# Patient Record
Sex: MALE | Race: WHITE | NOT HISPANIC OR LATINO | Employment: UNEMPLOYED | URBAN - METROPOLITAN AREA
[De-identification: names, ages, dates, MRNs, and addresses within clinical notes are randomized per-mention and may not be internally consistent; named-entity substitution may affect disease eponyms.]

---

## 2019-03-22 ENCOUNTER — HOSPITAL ENCOUNTER (INPATIENT)
Facility: CLINIC | Age: 39
LOS: 3 days | Discharge: HOME | End: 2019-03-25
Attending: EMERGENCY MEDICINE | Admitting: PSYCHIATRY & NEUROLOGY
Payer: MEDICAID

## 2019-03-22 ENCOUNTER — TELEPHONE (OUTPATIENT)
Dept: BEHAVIORAL HEALTH | Facility: CLINIC | Age: 39
End: 2019-03-22

## 2019-03-22 DIAGNOSIS — F10.220 ALCOHOL DEPENDENCE WITH UNCOMPLICATED INTOXICATION (H): ICD-10-CM

## 2019-03-22 DIAGNOSIS — R16.0 HEPATOMEGALY: ICD-10-CM

## 2019-03-22 DIAGNOSIS — K76.0 STEATOSIS OF LIVER: ICD-10-CM

## 2019-03-22 DIAGNOSIS — R74.01 TRANSAMINITIS: Primary | ICD-10-CM

## 2019-03-22 PROBLEM — F10.10 ETOH ABUSE: Status: ACTIVE | Noted: 2019-03-22

## 2019-03-22 LAB
ALBUMIN SERPL-MCNC: 4.5 G/DL (ref 3.4–5)
ALCOHOL BREATH TEST: 0.3 (ref 0–0.01)
ALP SERPL-CCNC: 115 U/L (ref 40–150)
ALT SERPL W P-5'-P-CCNC: 101 U/L (ref 0–70)
AMPHETAMINES UR QL SCN: NEGATIVE
ANION GAP SERPL CALCULATED.3IONS-SCNC: 15 MMOL/L (ref 3–14)
AST SERPL W P-5'-P-CCNC: 108 U/L (ref 0–45)
BARBITURATES UR QL: NEGATIVE
BASOPHILS # BLD AUTO: 0.1 10E9/L (ref 0–0.2)
BASOPHILS NFR BLD AUTO: 1.1 %
BENZODIAZ UR QL: NEGATIVE
BILIRUB SERPL-MCNC: 1.2 MG/DL (ref 0.2–1.3)
BUN SERPL-MCNC: 13 MG/DL (ref 7–30)
CALCIUM SERPL-MCNC: 8.6 MG/DL (ref 8.5–10.1)
CANNABINOIDS UR QL SCN: POSITIVE
CHLORIDE SERPL-SCNC: 102 MMOL/L (ref 94–109)
CO2 SERPL-SCNC: 24 MMOL/L (ref 20–32)
COCAINE UR QL: NEGATIVE
CREAT SERPL-MCNC: 0.77 MG/DL (ref 0.66–1.25)
DIFFERENTIAL METHOD BLD: NORMAL
EOSINOPHIL # BLD AUTO: 0.1 10E9/L (ref 0–0.7)
EOSINOPHIL NFR BLD AUTO: 1.6 %
ERYTHROCYTE [DISTWIDTH] IN BLOOD BY AUTOMATED COUNT: 13.4 % (ref 10–15)
ETHANOL UR QL SCN: POSITIVE
GFR SERPL CREATININE-BSD FRML MDRD: >90 ML/MIN/{1.73_M2}
GLUCOSE SERPL-MCNC: 83 MG/DL (ref 70–99)
HCT VFR BLD AUTO: 44.1 % (ref 40–53)
HGB BLD-MCNC: 15.1 G/DL (ref 13.3–17.7)
IMM GRANULOCYTES # BLD: 0 10E9/L (ref 0–0.4)
IMM GRANULOCYTES NFR BLD: 0.2 %
LYMPHOCYTES # BLD AUTO: 2.1 10E9/L (ref 0.8–5.3)
LYMPHOCYTES NFR BLD AUTO: 36.9 %
MCH RBC QN AUTO: 32.6 PG (ref 26.5–33)
MCHC RBC AUTO-ENTMCNC: 34.2 G/DL (ref 31.5–36.5)
MCV RBC AUTO: 95 FL (ref 78–100)
MONOCYTES # BLD AUTO: 0.3 10E9/L (ref 0–1.3)
MONOCYTES NFR BLD AUTO: 5.3 %
NEUTROPHILS # BLD AUTO: 3.1 10E9/L (ref 1.6–8.3)
NEUTROPHILS NFR BLD AUTO: 54.9 %
NRBC # BLD AUTO: 0 10*3/UL
NRBC BLD AUTO-RTO: 0 /100
OPIATES UR QL SCN: NEGATIVE
PLATELET # BLD AUTO: 236 10E9/L (ref 150–450)
POTASSIUM SERPL-SCNC: 4.1 MMOL/L (ref 3.4–5.3)
PROT SERPL-MCNC: 8.2 G/DL (ref 6.8–8.8)
RBC # BLD AUTO: 4.63 10E12/L (ref 4.4–5.9)
SODIUM SERPL-SCNC: 141 MMOL/L (ref 133–144)
WBC # BLD AUTO: 5.6 10E9/L (ref 4–11)

## 2019-03-22 PROCEDURE — 80320 DRUG SCREEN QUANTALCOHOLS: CPT | Performed by: EMERGENCY MEDICINE

## 2019-03-22 PROCEDURE — 85025 COMPLETE CBC W/AUTO DIFF WBC: CPT | Performed by: EMERGENCY MEDICINE

## 2019-03-22 PROCEDURE — 12800008 ZZH R&B CD ADULT

## 2019-03-22 PROCEDURE — 80307 DRUG TEST PRSMV CHEM ANLYZR: CPT | Performed by: EMERGENCY MEDICINE

## 2019-03-22 PROCEDURE — 80053 COMPREHEN METABOLIC PANEL: CPT | Performed by: EMERGENCY MEDICINE

## 2019-03-22 PROCEDURE — 25000132 ZZH RX MED GY IP 250 OP 250 PS 637: Performed by: PSYCHIATRY & NEUROLOGY

## 2019-03-22 PROCEDURE — 99285 EMERGENCY DEPT VISIT HI MDM: CPT | Mod: Z6 | Performed by: EMERGENCY MEDICINE

## 2019-03-22 PROCEDURE — 99285 EMERGENCY DEPT VISIT HI MDM: CPT | Mod: 25

## 2019-03-22 PROCEDURE — HZ2ZZZZ DETOXIFICATION SERVICES FOR SUBSTANCE ABUSE TREATMENT: ICD-10-PCS | Performed by: PSYCHIATRY & NEUROLOGY

## 2019-03-22 PROCEDURE — 82075 ASSAY OF BREATH ETHANOL: CPT

## 2019-03-22 RX ORDER — BISACODYL 10 MG
10 SUPPOSITORY, RECTAL RECTAL DAILY PRN
Status: DISCONTINUED | OUTPATIENT
Start: 2019-03-22 | End: 2019-03-25 | Stop reason: HOSPADM

## 2019-03-22 RX ORDER — LANOLIN ALCOHOL/MO/W.PET/CERES
100 CREAM (GRAM) TOPICAL DAILY
Status: DISCONTINUED | OUTPATIENT
Start: 2019-03-22 | End: 2019-03-25 | Stop reason: HOSPADM

## 2019-03-22 RX ORDER — MULTIPLE VITAMINS W/ MINERALS TAB 9MG-400MCG
1 TAB ORAL DAILY
Status: DISCONTINUED | OUTPATIENT
Start: 2019-03-22 | End: 2019-03-25 | Stop reason: HOSPADM

## 2019-03-22 RX ORDER — ALUMINA, MAGNESIA, AND SIMETHICONE 2400; 2400; 240 MG/30ML; MG/30ML; MG/30ML
30 SUSPENSION ORAL EVERY 4 HOURS PRN
Status: DISCONTINUED | OUTPATIENT
Start: 2019-03-22 | End: 2019-03-25 | Stop reason: HOSPADM

## 2019-03-22 RX ORDER — IBUPROFEN 200 MG
400 TABLET ORAL EVERY 6 HOURS PRN
Status: DISCONTINUED | OUTPATIENT
Start: 2019-03-22 | End: 2019-03-25 | Stop reason: HOSPADM

## 2019-03-22 RX ORDER — ATENOLOL 50 MG/1
50 TABLET ORAL DAILY PRN
Status: DISCONTINUED | OUTPATIENT
Start: 2019-03-22 | End: 2019-03-25 | Stop reason: HOSPADM

## 2019-03-22 RX ORDER — HYDROXYZINE HYDROCHLORIDE 25 MG/1
25 TABLET, FILM COATED ORAL EVERY 4 HOURS PRN
Status: DISCONTINUED | OUTPATIENT
Start: 2019-03-22 | End: 2019-03-25 | Stop reason: HOSPADM

## 2019-03-22 RX ORDER — DIAZEPAM 5 MG
5-20 TABLET ORAL EVERY 30 MIN PRN
Status: DISCONTINUED | OUTPATIENT
Start: 2019-03-22 | End: 2019-03-25 | Stop reason: HOSPADM

## 2019-03-22 RX ORDER — ONDANSETRON 4 MG/1
4 TABLET, ORALLY DISINTEGRATING ORAL EVERY 6 HOURS PRN
Status: DISCONTINUED | OUTPATIENT
Start: 2019-03-22 | End: 2019-03-25 | Stop reason: HOSPADM

## 2019-03-22 RX ORDER — CARBOXYMETHYLCELLULOSE SODIUM 5 MG/ML
1 SOLUTION/ DROPS OPHTHALMIC 3 TIMES DAILY PRN
Status: DISCONTINUED | OUTPATIENT
Start: 2019-03-22 | End: 2019-03-25 | Stop reason: HOSPADM

## 2019-03-22 RX ORDER — FOLIC ACID 1 MG/1
1 TABLET ORAL DAILY
Status: DISCONTINUED | OUTPATIENT
Start: 2019-03-22 | End: 2019-03-25 | Stop reason: HOSPADM

## 2019-03-22 RX ORDER — TRAZODONE HYDROCHLORIDE 50 MG/1
50 TABLET, FILM COATED ORAL
Status: DISCONTINUED | OUTPATIENT
Start: 2019-03-22 | End: 2019-03-25 | Stop reason: HOSPADM

## 2019-03-22 RX ADMIN — DIAZEPAM 5 MG: 5 TABLET ORAL at 20:20

## 2019-03-22 RX ADMIN — NICOTINE POLACRILEX 4 MG: 2 GUM, CHEWING BUCCAL at 20:20

## 2019-03-22 RX ADMIN — MULTIPLE VITAMINS W/ MINERALS TAB 1 TABLET: TAB at 18:55

## 2019-03-22 RX ADMIN — FOLIC ACID 1 MG: 1 TABLET ORAL at 18:55

## 2019-03-22 RX ADMIN — DIAZEPAM 10 MG: 5 TABLET ORAL at 23:59

## 2019-03-22 RX ADMIN — Medication 100 MG: at 18:55

## 2019-03-22 RX ADMIN — IBUPROFEN 400 MG: 200 TABLET, FILM COATED ORAL at 18:55

## 2019-03-22 RX ADMIN — HYDROXYZINE HYDROCHLORIDE 25 MG: 25 TABLET ORAL at 23:59

## 2019-03-22 RX ADMIN — NICOTINE POLACRILEX 4 MG: 2 GUM, CHEWING BUCCAL at 18:55

## 2019-03-22 ASSESSMENT — ACTIVITIES OF DAILY LIVING (ADL)
DRESS: INDEPENDENT;STREET CLOTHES
DRESS: 0-->INDEPENDENT
LAUNDRY: WITH SUPERVISION
SWALLOWING: 0-->SWALLOWS FOODS/LIQUIDS WITHOUT DIFFICULTY
TOILETING: 0-->INDEPENDENT
FALL_HISTORY_WITHIN_LAST_SIX_MONTHS: NO
BATHING: 0-->INDEPENDENT
AMBULATION: 0-->INDEPENDENT
HYGIENE/GROOMING: INDEPENDENT
RETIRED_COMMUNICATION: 0-->UNDERSTANDS/COMMUNICATES WITHOUT DIFFICULTY
RETIRED_EATING: 0-->INDEPENDENT
ORAL_HYGIENE: INDEPENDENT
TRANSFERRING: 0-->INDEPENDENT
COGNITION: 0 - NO COGNITION ISSUES REPORTED

## 2019-03-22 ASSESSMENT — ENCOUNTER SYMPTOMS: SEIZURES: 1

## 2019-03-22 NOTE — PHARMACY-ADMISSION MEDICATION HISTORY
Admission medication history for the March 22, 2019 admission is complete.     Medication history interview sources: patient    Medication compliance: N/A - patient not prescribed medications    Additional medication history information (including reliability of information, actions taken by pharmacist):  - Patient denies taking/being prescribed prescription medications and over-the-counter (OTC) products such as vitamins, sleep aids, etc.      Prior to Admission medications    Not on File       Time spent: 15 minutes    Medication history completed by:   Evelyn Guy, Nyla, [facility]  [facility]  Available daily from 1-9 PM: phone [phone], ascom *[phone], pager [phone]

## 2019-03-22 NOTE — TELEPHONE ENCOUNTER
"S: Dr Bermudez gave clinical saying pt presents in er seeking etoh detox.  B: breath is .305. He says he has been drinking 1 pint + of whiskey and \"some\" beer daily for past several years. He says he has been drinking an increased amt more recently. Utox pending, Pt denies drug use. Hx of a seizure. No chronic med prob's.  A: walking, denies SI, coop, vol, med cleared  R: paged bijal at 12:48 pm.  Bijal requests Utox be collected before pt admitting and also requests a CMP. If WNL, she said she accepts pt. Author informed er staff of this and requested they call back when CMP results are in.                 #3awest/straight cd/cisco accepted for herself                                           3a informed at 3:34 pm;    Er informed at 3:36 pm    mbw  "

## 2019-03-22 NOTE — PROGRESS NOTES
S = Situation:   Voluntary admit for alcohol withdrawal     B  = Background:   William Mills is a 38 year old male who lives in a home with his significant other, Danii. The patient arrived in the ED by private car from home with a complaint of Addiction Problem (ETOH abuse), binging for 6 months due to loss of a pregnancy by fiance. He was threatened by fiance if didn't get help today, the relationship is over.     History of seizures with withdrawls (2 & 4 years ago).The patient's current symptoms started/worsened 3 months ago. Drinking 10 drinks per day. Last Use: 0400 this morning.  UTOX + for ETOH & cannabis.     A  =  Assessment:   William was pleasant and cooperative with clothing search and admission interview. He is alert and oriented x 3. He denies h/o depression, anxiety, SI, SIB, SA, AH, VH, kendy, aggression. He denies medical history besides some orthopedic surgery on knee. Pt states he rarely uses marijuana, but drinks daily.    MSSA 4 in the ED and 4 on admit here.    William states he completed Rule 25 assessment on 3/13/19.     R =   Request or Recommendation:   Alcohol withdrawal monitoring & treatment with Valium.  Dr. Appiah to assess tomorrow.  Case management to see.  Internal Medicine to assess.   ETOH labs ordered.

## 2019-03-22 NOTE — PROGRESS NOTES
03/22/19 1701   Patient Belongings   Did you bring any home meds/supplements to the hospital?  Yes   Disposition of meds  Sent to security/pharmacy per site process   Patient Belongings other (see comments)  (storage bin, bin at desk, security)   Belongings Search Yes   Clothing Search Yes   Second Staff kristin   storage bin: jacket, boots, belt, lighter, necklace on cord, cigarettes, watch  Bin at desk: cell phone, wallet  [id number]: 3 visa, 2 [place] ID, care credit, $64 cash  [id number]  Contraband item sent to security  A               Admission:  I am responsible for any personal items that are not sent to the safe or pharmacy.  [facility] is not responsible for loss, theft or damage of any property in my possession.    Signature:  _________________________________ Date: _______  Time: _____                                              Staff Signature:  ____________________________ Date: ________  Time: _____      2nd Staff person, if patient is unable/unwilling to sign:    Signature: ________________________________ Date: ________  Time: _____     Discharge:  [facility] has returned all of my personal belongings:    Signature: _________________________________ Date: ________  Time: _____                                          Staff Signature:  ____________________________ Date: ________  Time: _____

## 2019-03-22 NOTE — TELEPHONE ENCOUNTER
Pt en route to ED for detox from alcohol    Pt insurance verified    Pt to arrive before 10:30 am

## 2019-03-22 NOTE — ED PROVIDER NOTES
[facility] EMERGENCY DEPARTMENT ([facility])    3/22/19        History     Chief Complaint   Patient presents with     Addiction Problem     ETOH abuse. binging for 6 months due to loss of a pregnancy by karina. denies SI. was threatened by karina if didn't get help today relationship is over. hx of seizures with withdrawls.      The history is provided by the patient.     William Mills is a 38 year old male with no significant past medical history who presents to the Emergency Department looking for detox from alcohol. Patients family has called ahead and they have a bed waiting for him in detox. Patient reports that he drinks whiskey daily; 1 pint of whiskey and some beers. Patients karina had a medical  6 months ago and since that time he has been emotional about it and binging on alcohol. Patient [place] [facility] [place] [address] [place] [address] for detox but left AMA because he felt unsafe at both places. Patient reports that he has had 2 seizures in the past from withdrawals. He reports shaking for other withdrawal symptoms. Patient denies any homicidal or suicidal ideations.     I have reviewed the Medications, Allergies, Past Medical and Surgical History, and Social History in the Epic system.    No past medical history on file.    No past surgical history on file.    No family history on file.    Social History     Tobacco Use     Smoking status: Not on file   Substance Use Topics     Alcohol use: Not on file       No current facility-administered medications for this encounter.      No current outpatient medications on file.      No Known Allergies      Review of Systems   Neurological: Positive for seizures (Previous).   Psychiatric/Behavioral: Negative for suicidal ideas.        Negative for homicidal ideations   All other systems reviewed and are negative.      Physical Exam   BP: (!) 140/97  Pulse: 103  Temp: 97  F (36.1  C)  Resp: 18  Weight: 95.3 kg (210 lb)  SpO2: 97 %      Physical Exam    Constitutional: He appears well-developed. No distress.   HENT:   Head: Normocephalic.   Eyes: EOM are normal. No scleral icterus.   Neck: Neck supple.   Pulmonary/Chest: No respiratory distress.   Musculoskeletal: He exhibits no deformity.   Neurological: He is alert.   Skin: Skin is dry.   Nursing note and vitals reviewed.      ED Course   12:32 PM  The patient was seen and examined by Eugene Bermudez DO in Room ED09.        Procedures           Labs Ordered and Resulted from Time of ED Arrival Up to the Time of Departure from the ED   COMPREHENSIVE METABOLIC PANEL - Abnormal; Notable for the following components:       Result Value    Anion Gap 15 (*)     All other components within normal limits   ALCOHOL BREATH TEST POCT - Abnormal; Notable for the following components:    Alcohol Breath Test 0.305 (*)     All other components within normal limits   CBC WITH PLATELETS DIFFERENTIAL   DRUG ABUSE SCREEN 6 CHEM DEP URINE ([facility])            Assessments & Plan (with Medical Decision Making)   38-year-old male presents to us with a chief complaint of alcohol dependence and requesting detox.  Patient includes but not limited to alcohol dependence, alcohol intoxication, alcohol withdrawal, depression.  Discussed with mental health intake.   There is a bed available and we will admit the patient to detox.    I have reviewed the nursing notes.    I have reviewed the findings, diagnosis, plan and need for follow up with the patient.       Medication List      There are no discharge medications for this visit.         Final diagnoses:   Alcohol dependence with uncomplicated intoxication (H)     Mathieu WHITEHEAD, am serving as a trained medical scribe to document services personally performed by Eugene Bermudez DO, based on the provider's statements to me.   Eugene WHITEHEAD DO, was physically present and have reviewed and verified the accuracy of this note documented by Mathieu Stroud.    3/22/2019   [facility]     Eugene Bermudez,   03/22/19 3826

## 2019-03-22 NOTE — ED NOTES
ED to Behavioral Floor Handoff    SITUATION  William Mills is a 38 year old male who speaks English and lives in a home with others The patient arrived in the ED by private car from home with a complaint of Addiction Problem (ETOH abuse. binging for 6 months due to loss of a pregnancy by fiance. denies SI. was threatened by fiance if didn't get help today relationship is over. hx of seizures with withdrawls. )  .The patient's current symptoms started/worsened 3 month(s) ago and during this time the symptoms have increased.   In the ED, pt was diagnosed with   Final diagnoses:   Alcohol dependence with uncomplicated intoxication (H)        Initial vitals were: BP: (!) 140/97  Pulse: 103  Temp: 97  F (36.1  C)  Resp: 18  Weight: 95.3 kg (210 lb)  SpO2: 97 %   --------  Is the patient diabetic? No   If yes, last blood glucose? --     If yes, was this treated in the ED? --  --------  Is the patient inebriated (ETOH) Yes or Impaired on other substances? No  MSSA done? Yes  Last MSSA score: --    Were withdrawal symptoms treated? N/A  Does the patient have a seizure history? Yes. If yes, date of most recent seizure--2 years ago   --------  Is the patient patient experiencing suicidal ideation? denies current or recent suicidal ideation     Homicidal ideation? denies current or recent homicidal ideation or behaviors.    Self-injurious behavior/urges? denies current or recent self injurious behavior or ideation.  ------  Was pt aggressive in the ED No  Was a code called No  Is the pt now cooperative? Yes  -------  Meds given in ED: Medications - No data to display   Family present during ED course? No  Family currently present? No    BACKGROUND  Does the patient have a cognitive impairment or developmental disability? No  Allergies: No Known Allergies.   Social demographics are   Social History     Socioeconomic History     Marital status: Single     Spouse name: Not on file     Number of children: Not on file     Years of  education: Not on file     Highest education level: Not on file   Occupational History     Not on file   Social Needs     Financial resource strain: Not on file     Food insecurity:     Worry: Not on file     Inability: Not on file     Transportation needs:     Medical: Not on file     Non-medical: Not on file   Tobacco Use     Smoking status: Not on file   Substance and Sexual Activity     Alcohol use: Not on file     Drug use: Not on file     Sexual activity: Not on file   Lifestyle     Physical activity:     Days per week: Not on file     Minutes per session: Not on file     Stress: Not on file   Relationships     Social connections:     Talks on phone: Not on file     Gets together: Not on file     Attends [employer] service: Not on file     Active member of club or organization: Not on file     Attends meetings of clubs or organizations: Not on file     Relationship status: Not on file     Intimate partner violence:     Fear of current or ex partner: Not on file     Emotionally abused: Not on file     Physically abused: Not on file     Forced sexual activity: Not on file   Other Topics Concern     Not on file   Social History Narrative     Not on file        ASSESSMENT  Labs results   Labs Ordered and Resulted from Time of ED Arrival Up to the Time of Departure from the ED   COMPREHENSIVE METABOLIC PANEL - Abnormal; Notable for the following components:       Result Value    Anion Gap 15 (*)      (*)      (*)     All other components within normal limits   ALCOHOL BREATH TEST POCT - Abnormal; Notable for the following components:    Alcohol Breath Test 0.305 (*)     All other components within normal limits   CBC WITH PLATELETS DIFFERENTIAL   DRUG ABUSE SCREEN 6 CHEM DEP URINE ([facility])      Imaging Studies: No results found for this or any previous visit (from the past 24 hour(s)).   Most recent vital signs BP (!) 140/97   Pulse 103   Temp 97  F (36.1  C) (Oral)   Resp 18   Wt 95.3 kg (210 lb)    SpO2 97%    Abnormal labs/tests/findings requiring intervention:---   Pain control: pt had none  Nausea control: pt had none    RECOMMENDATION  Are any infection precautions needed (MRSA, VRE, etc.)? No If yes, what infection? --  ---  Does the patient have mobility issues? independently. If yes, what device does the pt use? ---  ---  Is patient on 72 hour hold or commitment? No If on 72 hour hold, have hold and rights been given to patient? N/A  Are admitting orders written if after 10 p.m. ?N/A  Tasks needing to be completed:---     [facility]      [phone] [place] ED   [phone] [facility] ED

## 2019-03-23 LAB
CHOLEST SERPL-MCNC: 256 MG/DL
DEPRECATED CALCIDIOL+CALCIFEROL SERPL-MC: 14 UG/L (ref 20–75)
GGT SERPL-CCNC: 167 U/L (ref 0–75)
HDLC SERPL-MCNC: 123 MG/DL
LDLC SERPL CALC-MCNC: 116 MG/DL
NONHDLC SERPL-MCNC: 133 MG/DL
T4 FREE SERPL-MCNC: 0.96 NG/DL (ref 0.76–1.46)
TRIGL SERPL-MCNC: 85 MG/DL
TSH SERPL DL<=0.005 MIU/L-ACNC: 4.69 MU/L (ref 0.4–4)
VIT B12 SERPL-MCNC: 886 PG/ML (ref 193–986)

## 2019-03-23 PROCEDURE — 25000132 ZZH RX MED GY IP 250 OP 250 PS 637: Performed by: PHYSICIAN ASSISTANT

## 2019-03-23 PROCEDURE — 80061 LIPID PANEL: CPT | Performed by: PSYCHIATRY & NEUROLOGY

## 2019-03-23 PROCEDURE — 84439 ASSAY OF FREE THYROXINE: CPT | Performed by: PSYCHIATRY & NEUROLOGY

## 2019-03-23 PROCEDURE — 99222 1ST HOSP IP/OBS MODERATE 55: CPT | Mod: AI | Performed by: PSYCHIATRY & NEUROLOGY

## 2019-03-23 PROCEDURE — 25000132 ZZH RX MED GY IP 250 OP 250 PS 637: Performed by: PSYCHIATRY & NEUROLOGY

## 2019-03-23 PROCEDURE — 99222 1ST HOSP IP/OBS MODERATE 55: CPT | Performed by: PHYSICIAN ASSISTANT

## 2019-03-23 PROCEDURE — 84443 ASSAY THYROID STIM HORMONE: CPT | Performed by: PSYCHIATRY & NEUROLOGY

## 2019-03-23 PROCEDURE — 82306 VITAMIN D 25 HYDROXY: CPT | Performed by: PSYCHIATRY & NEUROLOGY

## 2019-03-23 PROCEDURE — 99207 ZZC CONSULT E&M CHANGED TO INITIAL LEVEL: CPT | Performed by: PHYSICIAN ASSISTANT

## 2019-03-23 PROCEDURE — 82977 ASSAY OF GGT: CPT | Performed by: PSYCHIATRY & NEUROLOGY

## 2019-03-23 PROCEDURE — 36415 COLL VENOUS BLD VENIPUNCTURE: CPT | Performed by: PSYCHIATRY & NEUROLOGY

## 2019-03-23 PROCEDURE — 82607 VITAMIN B-12: CPT | Performed by: PSYCHIATRY & NEUROLOGY

## 2019-03-23 PROCEDURE — 12800008 ZZH R&B CD ADULT

## 2019-03-23 RX ORDER — ERGOCALCIFEROL 1.25 MG/1
50000 CAPSULE, LIQUID FILLED ORAL
Status: DISCONTINUED | OUTPATIENT
Start: 2019-03-23 | End: 2019-03-25 | Stop reason: HOSPADM

## 2019-03-23 RX ADMIN — ERGOCALCIFEROL 50000 UNITS: 1.25 CAPSULE ORAL at 16:44

## 2019-03-23 RX ADMIN — FOLIC ACID 1 MG: 1 TABLET ORAL at 08:56

## 2019-03-23 RX ADMIN — MULTIPLE VITAMINS W/ MINERALS TAB 1 TABLET: TAB at 08:56

## 2019-03-23 RX ADMIN — DIAZEPAM 5 MG: 5 TABLET ORAL at 16:44

## 2019-03-23 RX ADMIN — NICOTINE POLACRILEX 4 MG: 2 GUM, CHEWING BUCCAL at 16:50

## 2019-03-23 RX ADMIN — Medication 100 MG: at 08:56

## 2019-03-23 RX ADMIN — IBUPROFEN 400 MG: 200 TABLET, FILM COATED ORAL at 08:56

## 2019-03-23 NOTE — PROGRESS NOTES
Visited with pt on the basis of hospital  request for spiritual support of pt. Reflected with pt around his hospital experience, sources of spiritual and emotional support and current spiritual health needs. Pt talked about his current situation and what it means for him and his girlfriend.  During my conversation with him, he expresses his happiness.      Emotional support. Reflective conversation integrating illness elements and family spiritual narratives.  I shared conversation that would invite God into the room and to bless him. Active listening with the patient was used.    Pt received spiritual support and reflective conversation in the context of this hospitalization.    I will inform unit  for follow up

## 2019-03-23 NOTE — CONSULTS
Internal Medicine Initial Visit    William Mills MRN# [id number]   Age: 38 year old YOB: 1980   Date of Admission: 3/22/2019     Reason for consult: Elevated LFTs, HTN       Requesting physician Dr. Gutierrez of psychiatry       SUBJECTIVE   HPI:   William Mills is a 38 year old male with history of alcohol abuse admitted to station 3A for detoxification from alcohol. Drinks 1 pint of whiskey daily plus a couple beer. He reports he has been drinking heavily and believes his drinking became a problem at the beginning of this year. He has a history of withdrawal seizures 4 years ago when he tried to detox at home.     Patient reports currently he feels well. Last evening he had nausea, shakiness and diarrhea which have resolved. He notes he had sweats and chills overnight which have also improved. Denies chest pain, headache, SOB, recent illness, abdominal pain, dysuria, numbness, weakness, rashes.         Past Medical History:     Past Medical History:   Diagnosis Date     Alcohol abuse       Reviewed & updated in [employer].     Past Surgical History:      Past Surgical History:   Procedure Laterality Date     KNEE SURGERY       MOUTH SURGERY        Reviewed & updated in Epic.     Medications prior to admission:     None         Allergies:   No Known Allergies      Social History:     Social History     Socioeconomic History     Marital status: Single     Spouse name: Not on file     Number of children: Not on file     Years of education: Not on file     Highest education level: Not on file   Occupational History     Not on file   Social Needs     Financial resource strain: Not on file     Food insecurity:     Worry: Not on file     Inability: Not on file     Transportation needs:     Medical: Not on file     Non-medical: Not on file   Tobacco Use     Smoking status: Current Every Day Smoker     Packs/day: 0.50     Smokeless tobacco: Never Used   Substance and Sexual Activity     Alcohol use: Yes     Drug  "use: Yes     Types: Marijuana     Sexual activity: Not on file   Lifestyle     Physical activity:     Days per week: Not on file     Minutes per session: Not on file     Stress: Not on file   Relationships     Social connections:     Talks on phone: Not on file     Gets together: Not on file     Attends [employer] service: Not on file     Active member of club or organization: Not on file     Attends meetings of clubs or organizations: Not on file     Relationship status: Not on file     Intimate partner violence:     Fear of current or ex partner: Not on file     Emotionally abused: Not on file     Physically abused: Not on file     Forced sexual activity: Not on file   Other Topics Concern     Not on file   Social History Narrative     Not on file        Family History:     Family History   Problem Relation Age of Onset     Substance Abuse Maternal Uncle         Reviewed & updated in Epic.     Review of Systems:   Ten point review of systems negative except as stated above in History of Present Illness.    OBJECTIVE   Physical Exam:   Blood pressure (!) 143/96, pulse 79, temperature 98.5  F (36.9  C), resp. rate 16, height 1.854 m (6' 1\"), weight 95.3 kg (210 lb), SpO2 95 %.  General: Alert, awake, and in NAD. Non-toxic appearing.  HEENT: NC/AT. Anicteric sclera. Eyes symmetric and free of discharge bilaterally. Mucous membranes moist.  Neck: Supple  Cardiovascular: RRR. S1,S2. No murmurs appreciated.  Lungs: Normal respiratory effort on RA. Lungs CTAB without wheezes, rales, or rhonchi.  Abdomen: Soft, non-tender, and nondistended with normoactive bowel sounds.  Extremities: Warm & well perfused. No peripheral edema.  Skin: No rashes, jaundice, or lesions on exposed areas of skin.  Neurologic: A&O X 3. Answers questions appropriately. Moves all extremities symmetrically.     Laboratory Data:   [facility]  Recent Labs   Lab 03/22/19  1316      POTASSIUM 4.1   CHLORIDE 102   CO2 24   ANIONGAP 15*   GLC 83   BUN 13 "   CR 0.77   GFRESTIMATED >90   GFRESTBLACK >90   VY 8.6   PROTTOTAL 8.2   ALBUMIN 4.5   BILITOTAL 1.2   ALKPHOS 115   *   *       CBC  Recent Labs   Lab 03/22/19  1316   WBC 5.6   RBC 4.63   HGB 15.1   HCT 44.1   MCV 95   MCH 32.6   MCHC 34.2   RDW 13.4          TSH  Recent Labs   Lab 03/23/19  0745   TSH 4.69*          Assessment and Plan/Recommendations:   William Mills is a 38 year old male with history of alcohol abuse admitted to station 3A for detoxification from alcohol.    #Alcohol withdrawal.   - Management per psychiatry   - Agree with folate, thiamine, multivitamin   - Agree with MSSA with valium     #Elevated TSH. TSH 4.69, Free T4 wnl. Suspect subclinical hypothyroidism.   - Follow up with PCP for repeat TSH in 4-6 weeks.     #Hypercholesterolemia. Tchol 256, , non , . Patient reports he has a very poor diet.   - Nutrition consult   - Lifestyle modifications including alcohol cessation and dietary modifications   - Follow up with PCP for ongoing management and consideration of initiation of statin therapy     #Transaminitis. , , Alk phos and tbili wnl. GGT elevated. Suspect mild alcohol hepatitis, however does not follow expected AST: ALT 2:1 ratio.   - Repeat CMP in AM       #Elevated blood pressure. In the setting of alcohol withdrawal. No prior diagnosis of HTN.   - Treatment of withdrawal as above   - Hydralazine PRN for SBP > 160 or DBP > 105   - Please notify medicine if BP remains > 150/90 after completion of withdrawal   - Follow up with PCP within 1-2 for BP monitoring     #Vitamin D deficiency. Vitamin D level 14.   - Initiate ergocalciferol 50,000 units weekly X 8 weeks   - Follow up with PCP for recheck of level and ongoing management in 6-8 weeks    Medicine will follow peripherally. Please page the Internal Medicine job code pager for any intercurrent medical issues which arise. Thank you for the opportunity to be a part of this  patient's care.    Angy Perez PA-C  Hospitalist Service  [phone]

## 2019-03-24 LAB
ALBUMIN SERPL-MCNC: 4.3 G/DL (ref 3.4–5)
ALP SERPL-CCNC: 104 U/L (ref 40–150)
ALT SERPL W P-5'-P-CCNC: 107 U/L (ref 0–70)
ANION GAP SERPL CALCULATED.3IONS-SCNC: 6 MMOL/L (ref 3–14)
AST SERPL W P-5'-P-CCNC: 104 U/L (ref 0–45)
BILIRUB SERPL-MCNC: 2.2 MG/DL (ref 0.2–1.3)
BUN SERPL-MCNC: 12 MG/DL (ref 7–30)
CALCIUM SERPL-MCNC: 9.5 MG/DL (ref 8.5–10.1)
CHLORIDE SERPL-SCNC: 105 MMOL/L (ref 94–109)
CO2 SERPL-SCNC: 29 MMOL/L (ref 20–32)
CREAT SERPL-MCNC: 0.95 MG/DL (ref 0.66–1.25)
GFR SERPL CREATININE-BSD FRML MDRD: >90 ML/MIN/{1.73_M2}
GLUCOSE SERPL-MCNC: 104 MG/DL (ref 70–99)
HAV IGM SERPL QL IA: NONREACTIVE
HBV SURFACE AG SERPL QL IA: NONREACTIVE
HCV AB SERPL QL IA: NONREACTIVE
POTASSIUM SERPL-SCNC: 4.3 MMOL/L (ref 3.4–5.3)
PROT SERPL-MCNC: 7.9 G/DL (ref 6.8–8.8)
SODIUM SERPL-SCNC: 140 MMOL/L (ref 133–144)

## 2019-03-24 PROCEDURE — 36415 COLL VENOUS BLD VENIPUNCTURE: CPT | Performed by: PHYSICIAN ASSISTANT

## 2019-03-24 PROCEDURE — 99231 SBSQ HOSP IP/OBS SF/LOW 25: CPT | Performed by: PHYSICIAN ASSISTANT

## 2019-03-24 PROCEDURE — 86803 HEPATITIS C AB TEST: CPT | Performed by: PHYSICIAN ASSISTANT

## 2019-03-24 PROCEDURE — 25000132 ZZH RX MED GY IP 250 OP 250 PS 637: Performed by: PSYCHIATRY & NEUROLOGY

## 2019-03-24 PROCEDURE — 80053 COMPREHEN METABOLIC PANEL: CPT | Performed by: PHYSICIAN ASSISTANT

## 2019-03-24 PROCEDURE — 86709 HEPATITIS A IGM ANTIBODY: CPT | Performed by: PHYSICIAN ASSISTANT

## 2019-03-24 PROCEDURE — 87340 HEPATITIS B SURFACE AG IA: CPT | Performed by: PHYSICIAN ASSISTANT

## 2019-03-24 PROCEDURE — 12800008 ZZH R&B CD ADULT

## 2019-03-24 RX ADMIN — TRAZODONE HYDROCHLORIDE 50 MG: 50 TABLET ORAL at 22:47

## 2019-03-24 RX ADMIN — NICOTINE POLACRILEX 4 MG: 2 GUM, CHEWING BUCCAL at 18:02

## 2019-03-24 RX ADMIN — MULTIPLE VITAMINS W/ MINERALS TAB 1 TABLET: TAB at 08:07

## 2019-03-24 RX ADMIN — NICOTINE POLACRILEX 4 MG: 2 GUM, CHEWING BUCCAL at 11:30

## 2019-03-24 RX ADMIN — IBUPROFEN 400 MG: 200 TABLET, FILM COATED ORAL at 06:53

## 2019-03-24 RX ADMIN — NICOTINE POLACRILEX 4 MG: 2 GUM, CHEWING BUCCAL at 13:48

## 2019-03-24 RX ADMIN — TRAZODONE HYDROCHLORIDE 50 MG: 50 TABLET ORAL at 00:43

## 2019-03-24 RX ADMIN — NICOTINE POLACRILEX 4 MG: 2 GUM, CHEWING BUCCAL at 08:53

## 2019-03-24 RX ADMIN — Medication 100 MG: at 08:07

## 2019-03-24 RX ADMIN — FOLIC ACID 1 MG: 1 TABLET ORAL at 08:07

## 2019-03-24 RX ADMIN — NICOTINE POLACRILEX 4 MG: 2 GUM, CHEWING BUCCAL at 21:01

## 2019-03-24 RX ADMIN — HYDROXYZINE HYDROCHLORIDE 25 MG: 25 TABLET ORAL at 00:43

## 2019-03-24 ASSESSMENT — ACTIVITIES OF DAILY LIVING (ADL)
DRESS: INDEPENDENT
HYGIENE/GROOMING: INDEPENDENT
ORAL_HYGIENE: INDEPENDENT

## 2019-03-24 NOTE — PROGRESS NOTES
"Brief medicine note:     In short, William Mills is a 38 year old male with history of alcohol abuse admitted to station 3A for detoxification from alcohol. Please see initial consult note by writer 3/23/19.     Today's vital signs, medications and nursing notes were reviewed.     /82 (BP Location: Left arm)   Pulse 89   Temp 97.2  F (36.2  C) (Oral)   Resp 16   Ht 1.854 m (6' 1\")   Wt 95.3 kg (210 lb)   SpO2 95%   BMI 27.71 kg/m       EXAM   Gen: alert and oriented   Abd: non tender, non distended, bowel sounds present in all quadrants, reza's sign negative     ROUTINE IP LABS (Last four results)  Recent Labs   Lab 03/24/19  0650 03/22/19  1316    141   POTASSIUM 4.3 4.1   CHLORIDE 105 102   CO2 29 24   ANIONGAP 6 15*   * 83   BUN 12 13   CR 0.95 0.77   VY 9.5 8.6   PROTTOTAL 7.9 8.2   ALBUMIN 4.3 4.5   BILITOTAL 2.2* 1.2   ALKPHOS 104 115   * 108*   * 101*        Recent Labs   Lab 03/22/19  1316   WBC 5.6   RBC 4.63   HGB 15.1   HCT 44.1   MCV 95   MCH 32.6   MCHC 34.2   RDW 13.4        No lab results found in last 7 days.     Glucose Values Latest Ref Rng & Units 3/22/2019 3/24/2019   Bedside Glucose (mg/dl )  - -- --   GLUCOSE 70 - 99 mg/dL 83 104(H)   Some recent data might be hidden          A/P  #Transaminitis. , , Alk phos and tbili wnl. GGT elevated. Suspect mild alcohol hepatitis, however does not follow expected AST: ALT 2:1 ratio. Repeat LFTs with stable ALT and AST. Patient denies abdominal pain. Tolerating diet. Hep C, hep B and hep A non reactive.   - RUQ US in AM   - Repeat CMP in AM        #Elevated blood pressure. In the setting of alcohol withdrawal. No prior diagnosis of HTN. Blood pressure at goal today.   - Treatment of withdrawal as above   - Hydralazine PRN for SBP > 160 or DBP > 105   - Please notify medicine if BP remains > 150/90 after completion of withdrawal   - Follow up with PCP within 1-2 for BP monitoring "     Medicine will continue to follow peripherally.      Angy Perez PA-C  Internal Medicine [facility] Hospitalist   [phone]

## 2019-03-24 NOTE — H&P
"[facility]   Psychiatric History & Physical  Admission date: 3/22/2019  iWlliam Mills  [id number]  [birth year]    Time: 57 minutes on encounter, >50% of which was spent in counseling and/or coordination of care consisting of: communication and education with the patient/family, lab/image/study evaluation, support staff communication, and other sources pertinent to excellent patient care.            Chief Complaint:   \"I am here because of my alcohol use\"        HPI:   William Mills with a past medical history of alcohol, cannabis was admitted 3/22/2019 for alcohol use.    According to documentation has had some elevated blood pressure elevated cholesterol elevated liver enzymes and has been using alcohol excessively.  Recently lost her child with his girlfriend which was traumatic and upsetting.    Upon visiting with him says that he is going through detoxification very well and is much better than when he tried to do it himself and had a seizure in the past.  He was not interested in ever having a seizure again.  Denies any thoughts of harming himself or others any sadness anhedonia energy problems attention or concentration issues sleep problems hopelessness or helplessness.  Denies any paranoia or hallucinations or any generalized anxiety panic or social anxiety.  Denies any obsessive-compulsive disorder symptoms posttraumatic stress disorder symptoms gambling addiction pornography addiction sexual addiction or shopping addiction.  Denies any eating disorder symptoms or previous manic episodes.    He is interested in improving in the hospital for withdrawal and then going to treatment he says he may have completed a rule 25 at [facility].    Physically describes sweating and some elevated blood pressure but otherwise feels healthy.        Past Psychiatric History:     Limited previous psychiatric history no previous suicide attempts no inpatient hospitalizations previous " traumatic brain injury from concussions related to sports, previous seizure related to withdrawal no electroconvulsive therapy.  No current physician.  No previous commitment nursing home time or violence.          Substance Use and History:     Substance use started at the age of a teenager with cannabis has not used this for years, cigarette smoking started as a teenager currently smoking half pack per day, alcohol use started at age 19 last drink 3 days ago.  No chemical dependency treatment no previous legal troubles.          Past Medical History:   PAST MEDICAL HISTORY:   Past Medical History:   Diagnosis Date     Alcohol abuse        PAST SURGICAL HISTORY:   Past Surgical History:   Procedure Laterality Date     KNEE SURGERY       MOUTH SURGERY               Family History:   FAMILY HISTORY:   Family History   Problem Relation Age of Onset     Substance Abuse Maternal Uncle      Alcoholism Maternal Grandfather            Social History:   SOCIAL HISTORY:   Social History     Socioeconomic History     Marital status: Single     Spouse name: Not on file     Number of children: Not on file     Years of education: Not on file     Highest education level: Not on file   Occupational History     Not on file   Social Needs     Financial resource strain: Not on file     Food insecurity:     Worry: Not on file     Inability: Not on file     Transportation needs:     Medical: Not on file     Non-medical: Not on file   Tobacco Use     Smoking status: Current Every Day Smoker     Packs/day: 0.50     Smokeless tobacco: Never Used   Substance and Sexual Activity     Alcohol use: Yes     Drug use: Yes     Types: Marijuana     Sexual activity: Not on file   Lifestyle     Physical activity:     Days per week: Not on file     Minutes per session: Not on file     Stress: Not on file   Relationships     Social connections:     Talks on phone: Not on file     Gets together: Not on file     Attends Restorationism service: Not on file     Active  member of club or organization: Not on file     Attends meetings of clubs or organizations: Not on file     Relationship status: Not on file     Intimate partner violence:     Fear of current or ex partner: Not on file     Emotionally abused: Not on file     Physically abused: Not on file     Forced sexual activity: Not on file   Other Topics Concern     Not on file   Social History Narrative    Lives in apartment with his girlfriend does not have any guns or weapons enjoys painting and playing music has been working off and on.            Physical ROS:   The patient endorsed the above issues. The remainder of 10-point review of systems was negative except as noted in HPI.         PTA Medications:     No medications prior to admission.          Allergies:   No Known Allergies       Labs:     Recent Results (from the past 48 hour(s))   Alcohol breath test POCT    Collection Time: 03/22/19 12:12 PM   Result Value Ref Range    Alcohol Breath Test 0.305 (A) 0.00 - 0.01   CBC with platelets differential    Collection Time: 03/22/19  1:16 PM   Result Value Ref Range    WBC 5.6 4.0 - 11.0 10e9/L    RBC Count 4.63 4.4 - 5.9 10e12/L    Hemoglobin 15.1 13.3 - 17.7 g/dL    Hematocrit 44.1 40.0 - 53.0 %    MCV 95 78 - 100 fl    MCH 32.6 26.5 - 33.0 pg    MCHC 34.2 31.5 - 36.5 g/dL    RDW 13.4 10.0 - 15.0 %    Platelet Count 236 150 - 450 10e9/L    Diff Method Automated Method     % Neutrophils 54.9 %    % Lymphocytes 36.9 %    % Monocytes 5.3 %    % Eosinophils 1.6 %    % Basophils 1.1 %    % Immature Granulocytes 0.2 %    Nucleated RBCs 0 0 /100    Absolute Neutrophil 3.1 1.6 - 8.3 10e9/L    Absolute Lymphocytes 2.1 0.8 - 5.3 10e9/L    Absolute Monocytes 0.3 0.0 - 1.3 10e9/L    Absolute Eosinophils 0.1 0.0 - 0.7 10e9/L    Absolute Basophils 0.1 0.0 - 0.2 10e9/L    Abs Immature Granulocytes 0.0 0 - 0.4 10e9/L    Absolute Nucleated RBC 0.0    Comprehensive metabolic panel    Collection Time: 03/22/19  1:16 PM   Result Value Ref  Range    Sodium 141 133 - 144 mmol/L    Potassium 4.1 3.4 - 5.3 mmol/L    Chloride 102 94 - 109 mmol/L    Carbon Dioxide 24 20 - 32 mmol/L    Anion Gap 15 (H) 3 - 14 mmol/L    Glucose 83 70 - 99 mg/dL    Urea Nitrogen 13 7 - 30 mg/dL    Creatinine 0.77 0.66 - 1.25 mg/dL    GFR Estimate >90 >60 mL/min/[1.73_m2]    GFR Estimate If Black >90 >60 mL/min/[1.73_m2]    Calcium 8.6 8.5 - 10.1 mg/dL    Bilirubin Total 1.2 0.2 - 1.3 mg/dL    Albumin 4.5 3.4 - 5.0 g/dL    Protein Total 8.2 6.8 - 8.8 g/dL    Alkaline Phosphatase 115 40 - 150 U/L     (H) 0 - 70 U/L     (H) 0 - 45 U/L   Drug abuse screen 6 urine (chem dep)    Collection Time: 03/22/19  2:41 PM   Result Value Ref Range    Amphetamine Qual Urine Negative NEG^Negative    Barbiturates Qual Urine Negative NEG^Negative    Benzodiazepine Qual Urine Negative NEG^Negative    Cannabinoids Qual Urine Positive (A) NEG^Negative    Cocaine Qual Urine Negative NEG^Negative    Ethanol Qual Urine Positive (A) NEG^Negative    Opiates Qualitative Urine Negative NEG^Negative   GGT    Collection Time: 03/23/19  7:45 AM   Result Value Ref Range     (H) 0 - 75 U/L   Lipid panel    Collection Time: 03/23/19  7:45 AM   Result Value Ref Range    Cholesterol 256 (H) <200 mg/dL    Triglycerides 85 <150 mg/dL    HDL Cholesterol 123 >39 mg/dL    LDL Cholesterol Calculated 116 (H) <100 mg/dL    Non HDL Cholesterol 133 (H) <130 mg/dL   TSH with free T4 reflex and/or T3 as indicated    Collection Time: 03/23/19  7:45 AM   Result Value Ref Range    TSH 4.69 (H) 0.40 - 4.00 mU/L   Vitamin B12    Collection Time: 03/23/19  7:45 AM   Result Value Ref Range    Vitamin B12 886 193 - 986 pg/mL   Vitamin D    Collection Time: 03/23/19  7:45 AM   Result Value Ref Range    Vitamin D Deficiency screening 14 (L) 20 - 75 ug/L   T4 free    Collection Time: 03/23/19  7:45 AM   Result Value Ref Range    T4 Free 0.96 0.76 - 1.46 ng/dL          Physical and Psychiatric Examination:     BP  "140/89 (BP Location: Left arm)   Pulse 82   Temp 98.9  F (37.2  C) (Tympanic)   Resp 16   Ht 1.854 m (6' 1\")   Wt 95.3 kg (210 lb)   SpO2 95%   BMI 27.71 kg/m    Weight is 210 lbs 0 oz  Body mass index is 27.71 kg/m .                                           Last 4 weights:  Wt Readings from Last 4 Encounters:   03/22/19 95.3 kg (210 lb)       Cetabolic risk assessment. 03/23/19      Reviewed patient profile for cardiometabolic risk factors    Date taken /Value  REFERENCE RANGE   Abdominal Obesity  (Waist Circumference)   See nursing flowsheet Women ?35 in (88 cm)   Men ?40 in (102 cm)      Triglycerides  Triglycerides   Date Value Ref Range Status   03/23/2019 85 <150 mg/dL Final       ?150 mg/dL (1.7 mmol/L) or current treatment for elevated triglycerides   HDL cholesterol  HDL Cholesterol   Date Value Ref Range Status   03/23/2019 123 >39 mg/dL Final   ]   Women <50 mg/dL (1.3 mmol/L) in women or current treatment for low HDL cholesterol  Men <40 mg/dL (1 mmol/L) in men or current treatment for low HDL cholesterol     Fasting plasma glucose (FPG) Lab Results   Component Value Date    GLC 83 03/22/2019      FPG ?100 mg/dL (5.6 mmol/L) or treatment for elevated blood glucose   Blood pressure  BP Readings from Last 3 Encounters:   03/23/19 140/89        Blood pressure ?130/85 mmHg or treatment for elevated blood pressure   Family History  See family history           Physical Exam:  I have reviewed the physical exam as documented by Ana on 3/23 and agree with findings and assessment and have no additional findings to add at this time.    Mental Status Exam:  William is a 38-year-old male with a black beard and black hair.  His speech is of an appropriate rate and tone in his language is intact.  Behavior is appropriate and he does not have any abnormal movements.  His affect is neutral and he laughs and smiles at times.  His mood he describes is great.  His thought content consists of the above without " thoughts of harming himself or others or current delusions.  His thought process is logical without looseness of association.  He does not have any abnormal perceptions.  He is alert and aware of his current location and circumstances.  His attention and concentration appear adequate.  His cognition and fund of knowledge appear normal.  His long-term/short-term/remote memory appears intact.  His insight and judgment are both fair.         Admission Diagnoses:   Alcohol use disorder severe  Tobacco use disorder  History of traumatic brain injury         Assessment & Plan:     Assessment:  William has had some recent stressful circumstances with his girlfriend and has been worsening in terms of his alcohol use.  He came in voluntarily for improvement of this in or out has already completed a rule 25 supposedly.  Was informed about detoxification protocol and alcohol use medications.  Was wanting information about these medications to think about possibly starting 1.  Informed about the potential side effects and benefits of each of the medications.  We await to connecting him with case management so that we can form adequate plan for him to transition to treatment.    Plan:  Continue voluntary hospitalization with detoxification using diazepam transition to treatment  Discussed alcohol use medications             Atilio Pete  [facility] Psychiat[facility]      The following document has been created with voice recognition software and may contain unintentional word substitutions.        Non clinically relevant CMS requirements:  Clinical Global Impressions  First:     Most recent:

## 2019-03-24 NOTE — PLAN OF CARE
S:  William has scored less than 8 on the MSSA scale for more than 24 hours.  He has required no medication for alcohol withdrawal for more than 24 hours.  He is visible on the unit, eating and drinking normally.  He was advised that he is NPO after midnight for an abdominal US tomorrow morning.  B:  Pt admitted for alcohol withdrawal and detoxification with a history of alcohol abuse and knee and oral surgery.  A:  Pt medically stable in the detox process AEB MSSA score of 7 and < eight for the last 24 hours.  R:  Pt removed from alcohol withdrawal monitoring.  Obtain VS twice daily and prepare for discharge.  NPO after MN tonight for US in the morning.

## 2019-03-25 ENCOUNTER — APPOINTMENT (OUTPATIENT)
Dept: ULTRASOUND IMAGING | Facility: CLINIC | Age: 39
End: 2019-03-25
Attending: PHYSICIAN ASSISTANT
Payer: MEDICAID

## 2019-03-25 VITALS
RESPIRATION RATE: 16 BRPM | DIASTOLIC BLOOD PRESSURE: 83 MMHG | HEART RATE: 90 BPM | OXYGEN SATURATION: 95 % | WEIGHT: 210 LBS | BODY MASS INDEX: 27.83 KG/M2 | HEIGHT: 73 IN | TEMPERATURE: 97.7 F | SYSTOLIC BLOOD PRESSURE: 124 MMHG

## 2019-03-25 LAB
ALBUMIN SERPL-MCNC: 4.4 G/DL (ref 3.4–5)
ALP SERPL-CCNC: 98 U/L (ref 40–150)
ALT SERPL W P-5'-P-CCNC: 139 U/L (ref 0–70)
ANION GAP SERPL CALCULATED.3IONS-SCNC: 8 MMOL/L (ref 3–14)
AST SERPL W P-5'-P-CCNC: 138 U/L (ref 0–45)
BILIRUB SERPL-MCNC: 1.9 MG/DL (ref 0.2–1.3)
BUN SERPL-MCNC: 13 MG/DL (ref 7–30)
CALCIUM SERPL-MCNC: 9.2 MG/DL (ref 8.5–10.1)
CHLORIDE SERPL-SCNC: 105 MMOL/L (ref 94–109)
CO2 SERPL-SCNC: 28 MMOL/L (ref 20–32)
CREAT SERPL-MCNC: 0.91 MG/DL (ref 0.66–1.25)
GFR SERPL CREATININE-BSD FRML MDRD: >90 ML/MIN/{1.73_M2}
GLUCOSE SERPL-MCNC: 98 MG/DL (ref 70–99)
POTASSIUM SERPL-SCNC: 4.1 MMOL/L (ref 3.4–5.3)
PROT SERPL-MCNC: 8.1 G/DL (ref 6.8–8.8)
SODIUM SERPL-SCNC: 141 MMOL/L (ref 133–144)

## 2019-03-25 PROCEDURE — 76705 ECHO EXAM OF ABDOMEN: CPT

## 2019-03-25 PROCEDURE — 25000132 ZZH RX MED GY IP 250 OP 250 PS 637: Performed by: PSYCHIATRY & NEUROLOGY

## 2019-03-25 PROCEDURE — 99231 SBSQ HOSP IP/OBS SF/LOW 25: CPT | Performed by: PHYSICIAN ASSISTANT

## 2019-03-25 PROCEDURE — 80053 COMPREHEN METABOLIC PANEL: CPT | Performed by: PHYSICIAN ASSISTANT

## 2019-03-25 PROCEDURE — 36415 COLL VENOUS BLD VENIPUNCTURE: CPT | Performed by: PHYSICIAN ASSISTANT

## 2019-03-25 RX ORDER — CARBOXYMETHYLCELLULOSE SODIUM 5 MG/ML
1 SOLUTION/ DROPS OPHTHALMIC 3 TIMES DAILY PRN
Qty: 1 BOTTLE | Refills: 0 | Status: SHIPPED | OUTPATIENT
Start: 2019-03-25

## 2019-03-25 RX ORDER — MULTIPLE VITAMINS W/ MINERALS TAB 9MG-400MCG
1 TAB ORAL DAILY
Qty: 30 TABLET | Refills: 0 | Status: SHIPPED | OUTPATIENT
Start: 2019-03-25

## 2019-03-25 RX ADMIN — FOLIC ACID 1 MG: 1 TABLET ORAL at 10:08

## 2019-03-25 RX ADMIN — NICOTINE POLACRILEX 4 MG: 2 GUM, CHEWING BUCCAL at 11:06

## 2019-03-25 RX ADMIN — NICOTINE POLACRILEX 4 MG: 2 GUM, CHEWING BUCCAL at 12:42

## 2019-03-25 RX ADMIN — MULTIPLE VITAMINS W/ MINERALS TAB 1 TABLET: TAB at 10:07

## 2019-03-25 RX ADMIN — Medication 100 MG: at 10:08

## 2019-03-25 ASSESSMENT — ACTIVITIES OF DAILY LIVING (ADL)
DRESS: INDEPENDENT
HYGIENE/GROOMING: INDEPENDENT
ORAL_HYGIENE: INDEPENDENT

## 2019-03-25 NOTE — PROGRESS NOTES
"Case Management Note  3/25/2019    Met with pt for discharge planning. Pt reports he has been working with \"Devonte\" at [facility] to set up treatment. Pt signed release (in paper chart). Pt reported Devonte recommended IOP, and is \"setting it up for me\". Pt reported he went to [address] [place] [address] to set up MA per Devonte's recommendation. Pt reports he has a girlfriend he lives with in [place], and reports he has \"a job and a car and all that\". Pt reports he also has support from his mom and sister in [place], who he reports were calling here everyday to get him a bed. Pt reports his use escalated beginning January 2019 when his fiance had an ectopic pregnancy. Pt reports this triggered increased use.     Writer spoke with Devonte at [facility] ([phone]). Devonte reports in reality pt was recommended to complete residential but is unwilling at this time, so she is willing to work with him and get him into IOP. Devonte reports pt did not return to sign necessary paperwork. Devonte was willing to fax release to writer to begin referring pt to programs. Devonte reported she is also working on [facility] funding. Pt signed these releases, writer faxed these back to Devonte. She reported she received them. Pt to continue coordinating admission to a program with Devonte.       Writer also received call from intake. Pt does not have insurance. Referred to business office.     Merry Garcia, ROCHELLE, [facility]          "

## 2019-03-25 NOTE — PROGRESS NOTES
"Brief Medicine Note    Internal Medicine following for follow-up of LFTs as well as RUQ US results. Please see consult performed by Angy Perez PA-C on 3/22 for complete history. Patient reports that he feels well and is ready to go home. He denies any abdominal pain, nausea/vomiting, jaundice, pruritis, change in bowel movements including no diarrhea, constipation, marlene colored stools or hematochezia. He denies any excessive tylenol use PTA. He denies any hx of autoimmune conditions. He discussed that he is working on obtaining MA insurance and will follow-up closely as outpatient.     Today's vital signs, medications, and nursing notes were reviewed.  ROUTINE IP LABS (Last four results)  Recent Labs   Lab 03/25/19  0735 03/24/19  0650 03/22/19  1316    140 141   POTASSIUM 4.1 4.3 4.1   CHLORIDE 105 105 102   CO2 28 29 24   ANIONGAP 8 6 15*   GLC 98 104* 83   BUN 13 12 13   CR 0.91 0.95 0.77   VY 9.2 9.5 8.6   PROTTOTAL 8.1 7.9 8.2   ALBUMIN 4.4 4.3 4.5   BILITOTAL 1.9* 2.2* 1.2   ALKPHOS 98 104 115   * 104* 108*   * 107* 101*     Recent Labs   Lab 03/22/19  1316   WBC 5.6   RBC 4.63   HGB 15.1   HCT 44.1   MCV 95   MCH 32.6   MCHC 34.2   RDW 13.4        No lab results found in last 7 days.     Glucose Values Latest Ref Rng & Units 3/22/2019 3/24/2019 3/25/2019   Bedside Glucose (mg/dl )  - -- -- --   GLUCOSE 70 - 99 mg/dL 83 104(H) 98   Some recent data might be hidden        All labs personally reviewed in [facility].  See A&P for additional results.     Unresulted Labs Ordered in the Past 30 Days of this Admission     Date and Time Order Name Status Description    3/23/2019 0745 T4 free In process              /83   Pulse 90   Temp 97.7  F (36.5  C) (Oral)   Resp 16   Ht 1.854 m (6' 1\")   Wt 95.3 kg (210 lb)   SpO2 95%   BMI 27.71 kg/m    GENERAL: Alert and oriented x 3. Appears comfortable. Answering questions appropriately.   HEENT: Anicteric sclera. Mucous membranes " moist.   CV: RRR. S1, S2. No murmurs appreciated.   RESPIRATORY: Effort normal on room air. Lungs CTAB with no wheezing, rales, rhonchi.   GI: Abdomen soft and non distended, bowel sounds present. No tenderness, rebound, guarding.   MUSCULOSKELETAL: No joint swelling or tenderness.   NEUROLOGICAL: No focal deficits. Moves all extremities.   EXTREMITIES: No peripheral edema. Intact bilateral pedal pulses.   SKIN: No jaundice. No rashes.     A/P:  #Transaminitis. Admission LFTs with , . Alk phos and tbili wnl. GGT elevated. On 3/24, LFTs continued to be elevated with ALT of 107,  and T bili increased to 2.2. Today, ALT and AST continue to increase to ,  and T bili decreased to 1.9. Hep C, Hep B, Hep A non-reactive. CONNIE US performed this AM with Hepatomegaly with steatosis. No evidence for cholelithiasis with negative Dewitt's sign. Abdominal exam today was benign without any guarding, rigidity or rebound tenderness. Discussed case with Hepatology who recommends outpatient follow-up either with PCP or Hepatology for follow-up LFTs. Maybe secondary to alcohol abuse, however, AST:ALT is not in a 2>1 fashion versus fatty liver. If continues to be elevated, Hepatology recommended obtaining autoimmune labs and considering fibrosis scan. GI referral placed in discharge navigator.   -Follow-up within 5-7 days of discharge for repeat LFTs   -Hepatology consult placed in discharge navigator for follow-up  -If LFTs continue to increase, consider autoimmune work-up   -Pt was educated to return to ED or call PCP if he develops fever >100.4F, abdominal pain, N/V, or jaundice.      #Elevated blood pressure. In the setting of alcohol withdrawal. No prior diagnosis of HTN. Blood pressure elevation resolved after withdrawal and has been well controlled since 3/24 AM.   - Follow up with PCP within 1-2 for BP monitoring  - Patient educated to have BP diary for BP monitoring and discussed lifestyle  modifications      #Elevated TSH. TSH 4.69, Free T4 wnl. Suspect subclinical hypothyroidism.   - Follow up with PCP for repeat TSH in 4-6 weeks.     #Hypercholesterolemia. Tchol 256, , non , . Patient reports he has a very poor diet. Nutrition was consulted for improvement of diet and for lifestyle modifications.   - Lifestyle modifications including alcohol cessation and dietary modifications   - Follow up with PCP for ongoing management and consideration of initiation of statin therapy     No further medical intervention is required at this time. Medicine signing off. Please feel free to call with any questions.       Marilee Evans PA-C  Hospitalist Service  Pager [phone]

## 2019-03-25 NOTE — DISCHARGE INSTRUCTIONS
Behavioral [place] Discharge Planning and Instructions  THANK YOU FOR CHOOSING THE [facility]  [place] 3[facility]  [phone]    Summary: You were admitted to [facility] [facility]A on 3/22/19 for detoxification from alcohol.  A medical exam was performed that included lab work. You have met with a  and opted to attend intensive outpatient treatment in the community.  You have been instructed to follow up with [facility] regarding insurance-related matters. Please take care and make your recovery a daily priority, Mr. Mills. It was a pleasure working with you and the entire treatment team here wishes you the very best in your recovery!     Main Diagnosis: Alcohol Dependence    Major Treatments, Procedures and Findings:  You were treated for alcohol detoxification using diazepam. Please take your medications as prescribed until it is gone. You had labs drawn and those results were reviewed with you. Please take a copy of your lab work with you to your next primary care physician appointment.    Symptoms to Report:  If you experience more anxiety, confusion, sleeplessness, deep sadness or thoughts of suicide, notify your treatment team or notify your primary care physician. IF THE SYMPTOMS YOU ARE EXPERIENCING ARE AN EMERGENCY CALL [phone] IMMEDIATELY.     Lifestyle Adjustment: Adjust your lifestyle to get enough sleep, relaxation, exercise and good nutrition. Continue to develop healthy coping skills to decrease stress and promote a sober living environment. Do not use mood altering substances including alcohol, illegal drugs or addictive medications other than what is currently prescribed.     MEDICAL Follow-up: Patient states that he will follow up as able at [facility] once he regains insurance and a schedule, and has been notified that he should be have repeat laboratory work within one week's time from date of discharge.    Resources:  AA/NA and Sponsors are excellent resources for support and you can find one at  any support group meeting.   http://www.aastpaul.org (then click meetings) This for the [facility] AA meetings   http://aamin[place].org/meetings/   This is for the [facility] AA meetings  http://www.naminsanfordota.us (then click find a  Meeting) This is for [facility] NA   SMART Recovery - self management for addiction recovery:  www.smartrecMONTAJy.org  Pathways ~ A Health Crisis Resource & Support Center:  399.756.4488.  https://prescribetoprevent.org/patient-education/videos/  Http://www.harmreduction.org   [facility] 091-928-0632  Support Group:  AA/NA and Sponsor/support.  National [place] on Mental Illness (www.[place].radha.org): 552.794.1811 or 666-059-5779.  Alcoholics Anonymous (www.alcoholics-anonymous.org): Check your phone book for your local chapter.  Suicide Awareness Voices of Education (SAVE) (www.save.org): 139-152-JAZP (6183)  National Suicide Prevention Line (www.mentalhealthmn.org): 015-958-ZEAU (1078)  Mental Health Consumer/Survivor Network of [place] (www.mhcsn.net): 585.150.2207 or 006-158-8900  Mental Health Association of [place] (www.mentalhealth.org): 446.432.1637 or 776-234-0401   Substance Abuse and Mental Health Services (www.samhsa.gov)    [facility] ([facility]) [facility] connects people seeking recovery to resources that help foster and sustain long-term recovery.  Whether you are seeking resources for treatment, transportation, housing, job training, education, health care or other pathways to recovery, [facility] is a great place to start.  774.163.1863.  www.[facility].org    General Medication Instructions:  See your medication sheet(s) for instructions.   Take all medications as prescribed.  Make no changes unless your doctor suggests them.   Go to all your doctor visits. Be sure to have all your required lab tests. This way, your medicines can be refilled on time.   Do not use any forms of alcohol.    Please Note: If you have any questions at anytime after you  are discharged please call the [facility] [place] detox unit 3AW unit at [phone].  [facility], Behavioral Intake [phone]   Medical Records call [phone]   Outpatient Behavioral Intake call [phone]  LP+ Wait List/Bed Availability call [phone]     Please take this discharge folder with you to all your follow up appointments, it contains lab results, diagnosis, and discharge recommendations.    THANK YOU FOR CHOOSING THE [facility]

## 2019-03-25 NOTE — DISCHARGE SUMMARY
Admit Date:     03/22/2019   Discharge Date:           More than 35 minutes spent on this Discharge Summary, doing the discharge instructions, discharge medications, discharge mental status examination.      DISCHARGE DIAGNOSIS:   AXIS I:  Alcohol use disorder, severe.      Please see the detailed admission note by Dr. Stubbs on 03/22/2019.      HOSPITAL COURSE:  During the hospitalization, the patient had an uneventful detox.  He is out of detox.  He had lab work done, which shows bilirubin 1.9, , AST is 138.  Hepatitis C is negative.  During the hospitalization, the patient's energy, motivation, sleep and interest improved.  He did not have any suicidal or homicidal ideation, plan or intent.  His plan is to go to [facility].  He is out of detox.  He had an ultrasound done pending.  Upon the result and being medically cleared, he will be discharged.  During the hospitalization, the patient was seen by Angy Perez for Internal Medicine consult.               DISCHARGE MENTAL STATUS EXAMINATION:  The patient is alert, oriented x3.  Good fund of knowledge.  Good use of language.  Recent and remote memory, language, fund of knowledge are all adequate.  Euthymic mood congruent affect  Speech normal rate/rhythm linear tp no loose asso,The patient does not have any active suicidal or homicidal ideation.  Does not have any auditory or visual hallucination.  Fair insight/judgment At this time, the patient was stable to be discharged.        Pt was not determined to not be a danger to himself or others. At the current time of discharge, the patient does not meet criteria for involuntary hospitalization. On the day of discharge, the patient reports that they do not have suicidal or homicidal ideation and would never hurt themselves or others. Steps taken to minimize risk include: assessing patient s behavior and thought process daily during hospital stay, discharging patient with adequate plan for follow up  for mental and physical health and discussing safety plan of returning to the hospital should the patient ever have thoughts of harming themselves or others. Therefore, based on all available evidence including the factors cited above, the patient does not appear to be at imminent risk for self-harm, and is appropriate for outpatient level of care.     Educated about side effects/risk vs benefits /alternative including non treatment.Pt consented to be on medication.     .Total time spent on discharge summary more than 35 min  More than  20 min  planning, coordination of care, medication reconciliation and performance of physical exam on day of discharge.Care was coordinated with unit RN and unit therapist  MEDICAL Follow-up: Patient states that he will follow up as able at [facility] once he regains insurance and a schedule, and has been notified that he should be have repeat laboratory work within one week's time from date of discharge.     Elmer Mills   Home Medication Instructions ANI:[id number]    Printed on:19 1246   Medication Information                      Carboxymethylcellulose Sod PF (REFRESH PLUS) 0.5 % SOLN ophthalmic solution  Place 1 drop into both eyes 3 times daily as needed for dry eyes             multivitamin w/minerals (THERA-VIT-M) tablet  Take 1 tablet by mouth daily               KRIS BAEZ MD             D: 2019   T: 2019   MT: BEN      Name:     ELMER MILLS   MRN:      [id number]        Account:        [id number]   :      1980           Admit Date:     2019                                  Discharge Date:       Document: [id number]

## 2019-03-25 NOTE — PROGRESS NOTES
Clinical Nutrition Services Note    William Mills is a/an 38 year old male seen by the dietitian for Provider Order - Hypercholesterolemia    NUTRITION HISTORY  Pt reports that his usual intakes at home are very healthy but that over the past couple of months he has been eating a lot of junk food and drinking heavily. Usual intakes prior to this time are Breakfast: greek yogurt with granola; Lunch: deli sandwich, fruit, vegetables; Dinner: fish/chicken, vegetables.    INTERVENTIONS  Implementation  Nutrition Education: Discussed high LDL and total cholesterol levels. Provided handout Cholesterol-Lowing Nutrition Therapy and encouraged well-balanced diet focused on fruits, vegetables, lean proteins, whole grains, adequate fiber, and heart healthy fats. Levels also impacted likely d/t heavy alcohol use. Pt with no further nutritional questions. Patient seems motivated and looking forward to getting back to his usual healthier lifestyle.    No nutrition follow-up warranted at this time. RD to sign off. Please consult if further needs arise.      Chrissy Bates RD, LD  Unit pgr: [phone]

## 2020-05-06 ENCOUNTER — NURSE TRIAGE (OUTPATIENT)
Dept: TELEHEALTH | Age: 40
End: 2020-05-06

## 2020-08-27 ENCOUNTER — TELEPHONE (OUTPATIENT)
Dept: OTOLARYNGOLOGY | Age: 40
End: 2020-08-27

## 2023-09-12 NOTE — TELEPHONE ENCOUNTER
FUTURE VISIT INFORMATION      FUTURE VISIT INFORMATION:  Date: 9/13/23  Time: 8:00am  [place] [facility] [place]FORMATION:  Reason for visit/diagnosis  ear pinning     RECORDS REQUESTED FROM:       No recs to collect

## 2023-12-13 ENCOUNTER — PRE VISIT (OUTPATIENT)
Dept: PLASTIC SURGERY | Facility: CLINIC | Age: 43
End: 2023-12-13